# Patient Record
Sex: MALE | Race: BLACK OR AFRICAN AMERICAN | NOT HISPANIC OR LATINO | URBAN - METROPOLITAN AREA
[De-identification: names, ages, dates, MRNs, and addresses within clinical notes are randomized per-mention and may not be internally consistent; named-entity substitution may affect disease eponyms.]

---

## 2022-04-28 ENCOUNTER — HOSPITAL ENCOUNTER (EMERGENCY)
Facility: HOSPITAL | Age: 77
Discharge: PSYCHIATRIC OR SUBSTANCE USE FACILITY | End: 2022-04-29
Attending: EMERGENCY MEDICINE
Payer: MEDICARE

## 2022-04-28 VITALS
HEIGHT: 71 IN | BODY MASS INDEX: 26.18 KG/M2 | HEART RATE: 91 BPM | DIASTOLIC BLOOD PRESSURE: 81 MMHG | WEIGHT: 187 LBS | RESPIRATION RATE: 15 BRPM | OXYGEN SATURATION: 100 % | TEMPERATURE: 98 F | SYSTOLIC BLOOD PRESSURE: 143 MMHG

## 2022-04-28 DIAGNOSIS — R41.82 ALTERED MENTAL STATUS: ICD-10-CM

## 2022-04-28 DIAGNOSIS — F03.91 DEMENTIA WITH BEHAVIORAL DISTURBANCE, UNSPECIFIED DEMENTIA TYPE: Primary | ICD-10-CM

## 2022-04-28 DIAGNOSIS — R41.82 AMS (ALTERED MENTAL STATUS): ICD-10-CM

## 2022-04-28 LAB
ALBUMIN SERPL BCP-MCNC: 4.1 G/DL (ref 3.5–5.2)
ALP SERPL-CCNC: 70 U/L (ref 55–135)
ALT SERPL W/O P-5'-P-CCNC: 10 U/L (ref 10–44)
AMPHET+METHAMPHET UR QL: NEGATIVE
ANION GAP SERPL CALC-SCNC: 13 MMOL/L (ref 8–16)
ANISOCYTOSIS BLD QL SMEAR: SLIGHT
APAP SERPL-MCNC: <3 UG/ML (ref 10–20)
AST SERPL-CCNC: 18 U/L (ref 10–40)
BARBITURATES UR QL SCN>200 NG/ML: NEGATIVE
BASOPHILS # BLD AUTO: 0.01 K/UL (ref 0–0.2)
BASOPHILS NFR BLD: 0.2 % (ref 0–1.9)
BENZODIAZ UR QL SCN>200 NG/ML: NEGATIVE
BILIRUB SERPL-MCNC: 0.3 MG/DL (ref 0.1–1)
BILIRUB UR QL STRIP: NEGATIVE
BUN SERPL-MCNC: 32 MG/DL (ref 8–23)
BZE UR QL SCN: NEGATIVE
CALCIUM SERPL-MCNC: 9.8 MG/DL (ref 8.7–10.5)
CANNABINOIDS UR QL SCN: NEGATIVE
CHLORIDE SERPL-SCNC: 106 MMOL/L (ref 95–110)
CLARITY UR: CLEAR
CO2 SERPL-SCNC: 18 MMOL/L (ref 23–29)
COLOR UR: YELLOW
CREAT SERPL-MCNC: 1.6 MG/DL (ref 0.5–1.4)
CREAT UR-MCNC: 246.8 MG/DL (ref 23–375)
CTP QC/QA: YES
DIFFERENTIAL METHOD: ABNORMAL
EOSINOPHIL # BLD AUTO: 0 K/UL (ref 0–0.5)
EOSINOPHIL NFR BLD: 0.6 % (ref 0–8)
ERYTHROCYTE [DISTWIDTH] IN BLOOD BY AUTOMATED COUNT: 17.7 % (ref 11.5–14.5)
EST. GFR  (AFRICAN AMERICAN): 47 ML/MIN/1.73 M^2
EST. GFR  (NON AFRICAN AMERICAN): 41 ML/MIN/1.73 M^2
ETHANOL SERPL-MCNC: <10 MG/DL
GLUCOSE SERPL-MCNC: 153 MG/DL (ref 70–110)
GLUCOSE UR QL STRIP: NEGATIVE
HCT VFR BLD AUTO: 34.6 % (ref 40–54)
HGB BLD-MCNC: 10.4 G/DL (ref 14–18)
HGB UR QL STRIP: NEGATIVE
HYPOCHROMIA BLD QL SMEAR: ABNORMAL
IMM GRANULOCYTES # BLD AUTO: 0.01 K/UL (ref 0–0.04)
IMM GRANULOCYTES NFR BLD AUTO: 0.2 % (ref 0–0.5)
KETONES UR QL STRIP: ABNORMAL
LEUKOCYTE ESTERASE UR QL STRIP: NEGATIVE
LYMPHOCYTES # BLD AUTO: 0.7 K/UL (ref 1–4.8)
LYMPHOCYTES NFR BLD: 15.9 % (ref 18–48)
MAGNESIUM SERPL-MCNC: 2 MG/DL (ref 1.6–2.6)
MCH RBC QN AUTO: 21.9 PG (ref 27–31)
MCHC RBC AUTO-ENTMCNC: 30.1 G/DL (ref 32–36)
MCV RBC AUTO: 73 FL (ref 82–98)
METHADONE UR QL SCN>300 NG/ML: NEGATIVE
MONOCYTES # BLD AUTO: 0.7 K/UL (ref 0.3–1)
MONOCYTES NFR BLD: 14.4 % (ref 4–15)
NEUTROPHILS # BLD AUTO: 3.2 K/UL (ref 1.8–7.7)
NEUTROPHILS NFR BLD: 68.7 % (ref 38–73)
NITRITE UR QL STRIP: NEGATIVE
NRBC BLD-RTO: 0 /100 WBC
OPIATES UR QL SCN: NEGATIVE
PCP UR QL SCN>25 NG/ML: NEGATIVE
PH UR STRIP: 6 [PH] (ref 5–8)
PLATELET # BLD AUTO: 168 K/UL (ref 150–450)
PLATELET BLD QL SMEAR: ABNORMAL
PMV BLD AUTO: ABNORMAL FL (ref 9.2–12.9)
POCT GLUCOSE: 144 MG/DL (ref 70–110)
POIKILOCYTOSIS BLD QL SMEAR: ABNORMAL
POTASSIUM SERPL-SCNC: 4.6 MMOL/L (ref 3.5–5.1)
PROT SERPL-MCNC: 8.2 G/DL (ref 6–8.4)
PROT UR QL STRIP: ABNORMAL
RBC # BLD AUTO: 4.74 M/UL (ref 4.6–6.2)
SALICYLATES SERPL-MCNC: <5 MG/DL (ref 15–30)
SARS-COV-2 RDRP RESP QL NAA+PROBE: NEGATIVE
SODIUM SERPL-SCNC: 137 MMOL/L (ref 136–145)
SP GR UR STRIP: 1.02 (ref 1–1.03)
TOXICOLOGY INFORMATION: NORMAL
TROPONIN I SERPL DL<=0.01 NG/ML-MCNC: 0.01 NG/ML (ref 0–0.03)
TSH SERPL DL<=0.005 MIU/L-ACNC: 1.03 UIU/ML (ref 0.4–4)
URN SPEC COLLECT METH UR: ABNORMAL
UROBILINOGEN UR STRIP-ACNC: NEGATIVE EU/DL
WBC # BLD AUTO: 4.66 K/UL (ref 3.9–12.7)

## 2022-04-28 PROCEDURE — U0002 COVID-19 LAB TEST NON-CDC: HCPCS | Performed by: EMERGENCY MEDICINE

## 2022-04-28 PROCEDURE — 93010 ELECTROCARDIOGRAM REPORT: CPT | Mod: ,,, | Performed by: INTERNAL MEDICINE

## 2022-04-28 PROCEDURE — 83735 ASSAY OF MAGNESIUM: CPT | Performed by: EMERGENCY MEDICINE

## 2022-04-28 PROCEDURE — 80053 COMPREHEN METABOLIC PANEL: CPT | Performed by: EMERGENCY MEDICINE

## 2022-04-28 PROCEDURE — 82077 ASSAY SPEC XCP UR&BREATH IA: CPT | Performed by: EMERGENCY MEDICINE

## 2022-04-28 PROCEDURE — 84443 ASSAY THYROID STIM HORMONE: CPT | Performed by: EMERGENCY MEDICINE

## 2022-04-28 PROCEDURE — 85025 COMPLETE CBC W/AUTO DIFF WBC: CPT | Performed by: EMERGENCY MEDICINE

## 2022-04-28 PROCEDURE — 84484 ASSAY OF TROPONIN QUANT: CPT | Performed by: EMERGENCY MEDICINE

## 2022-04-28 PROCEDURE — 93005 ELECTROCARDIOGRAM TRACING: CPT

## 2022-04-28 PROCEDURE — 81003 URINALYSIS AUTO W/O SCOPE: CPT | Mod: 59 | Performed by: EMERGENCY MEDICINE

## 2022-04-28 PROCEDURE — 80143 DRUG ASSAY ACETAMINOPHEN: CPT | Performed by: EMERGENCY MEDICINE

## 2022-04-28 PROCEDURE — 99285 EMERGENCY DEPT VISIT HI MDM: CPT | Mod: 25

## 2022-04-28 PROCEDURE — 80307 DRUG TEST PRSMV CHEM ANLYZR: CPT | Performed by: EMERGENCY MEDICINE

## 2022-04-28 PROCEDURE — 80179 DRUG ASSAY SALICYLATE: CPT | Performed by: EMERGENCY MEDICINE

## 2022-04-28 PROCEDURE — 93010 EKG 12-LEAD: ICD-10-PCS | Mod: ,,, | Performed by: INTERNAL MEDICINE

## 2022-04-28 PROCEDURE — 82962 GLUCOSE BLOOD TEST: CPT

## 2022-04-28 NOTE — ED NOTES
Patient identifiers verified and correct for patient  C/C: ams  APPEARANCE: Patient is awake, alert to self only. Dishelved in appearance. Clothes dirty, multiple layers of cloth inappropriate for season,   SKIN: warm, dry and with large abrasions to right elbow region. No breakdown or bruising noted at present time. Poor tugor. Dry mucous membranes  MUSCULOSKELETAL: Patient moving all extremities spontaneously, no obvious swelling or deformities noted. Unsteady gait noted.  RESPIRATORY: Denies shortness of breath.Respirations unlabored. Coarse respirations.  CARDIAC: Denies CP,  Present distal pulses; no peripheral edema noted.  ABDOMEN: denies abdominal pain and n/v/d   : unable to state contience status. Condom cath applied for urine sample.  Neurologic: AAO to self only. Unknown medical history. Patient has illogical thought process, rambling of speech, ; follows commands equal strength in all extremities; denies numbness/tingling. Denies dizziness

## 2022-04-28 NOTE — ED PROVIDER NOTES
Encounter Date: 4/28/2022       History     Chief Complaint   Patient presents with    Fatigue     Patient states that he became overheated while outside. He was trying to assist himself to ground to rest when bystander tried to assist, causing him to fall. Reports abrasion to right elbow but otherwise no complaints. EMS reports that patient was oriented to self only at scene and was slightly combative. On arrival to ED, awake, alert, oriented x 4. Denies pain. No recent illness reported. No distress.      76-year-old male who appears to me to have a history of dementia presents via EMS after they were called by bystanders.  He was found in a parking lot, he did tell people upfront that he was overheated while he was outside, he tells me that he stepped off of a curb causing his knee to be weak and falling down.  He is alert oriented x1 to name only.  He has no acute complaints.  He has abrasions noted to the right forearm with no active bleeding.        Review of patient's allergies indicates:  No Known Allergies  Past Medical History:   Diagnosis Date    Bipolar disorder, unspecified     PTSD (post-traumatic stress disorder)     Seizure      No past surgical history on file.  No family history on file.  Social History     Tobacco Use    Smoking status: Unknown If Ever Smoked   Substance Use Topics    Alcohol use: Not Currently    Drug use: Not Currently     Review of Systems   Unable to perform ROS: Dementia       Physical Exam     Initial Vitals [04/28/22 1343]   BP Pulse Resp Temp SpO2   (!) 146/87 (!) 121 16 97.8 °F (36.6 °C) 99 %      MAP       --         Physical Exam    Constitutional: He appears well-developed and well-nourished.   HENT:   Head: Normocephalic and atraumatic.   Cardiovascular: Normal rate and regular rhythm.   No murmur heard.  Pulmonary/Chest: Breath sounds normal.   Abdominal: Abdomen is soft. There is no abdominal tenderness.   Musculoskeletal:      Comments: Pulses 2+ to all 4  extremities, there is no obvious deformity or dislocation noted to any extremity including the right upper extremity where abrasions are present.     Neurological: He is alert.   Patient is oriented x1 to name   Skin: Capillary refill takes less than 2 seconds.   Two small abrasions noted to the right upper extremity with no active bleeding   Psychiatric: He has a normal mood and affect.         ED Course   Procedures  Labs Reviewed   CBC W/ AUTO DIFFERENTIAL - Abnormal; Notable for the following components:       Result Value    Hemoglobin 10.4 (*)     Hematocrit 34.6 (*)     MCV 73 (*)     MCH 21.9 (*)     MCHC 30.1 (*)     RDW 17.7 (*)     Lymph # 0.7 (*)     Lymph % 15.9 (*)     All other components within normal limits   COMPREHENSIVE METABOLIC PANEL - Abnormal; Notable for the following components:    CO2 18 (*)     Glucose 153 (*)     BUN 32 (*)     Creatinine 1.6 (*)     eGFR if  47 (*)     eGFR if non  41 (*)     All other components within normal limits   URINALYSIS, REFLEX TO URINE CULTURE - Abnormal; Notable for the following components:    Protein, UA Trace (*)     Ketones, UA 1+ (*)     All other components within normal limits    Narrative:     Specimen Source->Urine   SALICYLATE LEVEL - Abnormal; Notable for the following components:    Salicylate Lvl <5.0 (*)     All other components within normal limits   ACETAMINOPHEN LEVEL - Abnormal; Notable for the following components:    Acetaminophen (Tylenol), Serum <3.0 (*)     All other components within normal limits   POCT GLUCOSE - Abnormal; Notable for the following components:    POCT Glucose 144 (*)     All other components within normal limits   DRUG SCREEN PANEL, URINE EMERGENCY    Narrative:     Specimen Source->Urine   MAGNESIUM   TROPONIN I   TSH   ALCOHOL,MEDICAL (ETHANOL)   ACETAMINOPHEN LEVEL   SALICYLATE LEVEL   SARS-COV-2 RDRP GENE        ECG Results          EKG 12-lead (Final result)  Result time 04/29/22  06:14:27    Final result by Interface, Lab In [facility] (04/29/22 06:14:27)                 Narrative:    Test Reason : R41.82,    Vent. Rate : 104 BPM     Atrial Rate : 104 BPM     P-R Int : 164 ms          QRS Dur : 078 ms      QT Int : 306 ms       P-R-T Axes : 070 051 031 degrees     QTc Int : 402 ms    Sinus tachycardia  Nonspecific T wave abnormality  No previous ECGs available  Confirmed by RAMONA PIMENTEL, AGNIESZKA ([id number]) on 4/29/2022 6:14:19 AM    Referred By: STEPHENIE   SELF           Confirmed By:AGNIESZKA GREENE MD                            Imaging Results          CT Head Without Contrast (Final result)  Result time 04/28/22 15:10:32    Final result by Carl Payton MD (04/28/22 15:10:32)                 Impression:      No acute large vascular territory infarct or intracranial hemorrhage identified.  If persistent neurologic deficit, MRI brain with diffusion-weighted sequences can be obtained.    Ventriculomegaly out of proportion to sulcal prominence which could reflect nonspecific normal pressure hydrocephalus.  Correlate clinically.    Senescent change and mild chronic microvascular ischemic change.    Suspected left maxillary chronic sinusitis.      Electronically signed by: Carl Payton MD  Date:    04/28/2022  Time:    15:10             Narrative:    EXAMINATION:  CT HEAD WITHOUT CONTRAST    CLINICAL HISTORY:  AMS;    TECHNIQUE:  Low dose axial CT images obtained throughout the head without intravenous contrast. Sagittal and coronal reconstructions were performed.    COMPARISON:  None.    FINDINGS:  Intracranial compartment: Brain appears normally formed.    Mild generalized cerebral volume loss.  Ventricles are midline and stable in size and configuration without distortion by mass effect noting ventriculomegaly slightly out of proportion to sulcal prominence.  No extra-axial blood or fluid collections.    Mild patchy hypoattenuation of the subcortical and periventricular white matter consistent with  chronic microvascular ischemic change.  No parenchymal mass, hemorrhage, edema or major vascular distribution infarct.    Skull/extracranial contents (limited evaluation): No displaced skull fracture.  Chronic appearing deformity of the right lamina papyracea likely related to remote trauma.  Near-total opacification of the left maxillary sinus with slight hyperostosis suggesting chronic sinusitis.  Remaining paranasal sinuses are clear.  Mastoid air cells are clear.  Probable prior surgery to the bilateral ocular lenses.                               X-Ray Chest 1 View (Final result)  Result time 04/28/22 14:51:01    Final result by Carl Payton MD (04/28/22 14:51:01)                 Impression:      Bibasilar subtle increased interstitial opacities which could reflect pulmonary edema versus chronic interstitial lung changes versus interstitial type pneumonia.  No large consolidation.      Electronically signed by: Carl Payton MD  Date:    04/28/2022  Time:    14:51             Narrative:    EXAMINATION:  XR CHEST 1 VIEW    CLINICAL HISTORY:  Altered mental status, unspecified    TECHNIQUE:  Single frontal view of the chest was performed.    COMPARISON:  None    FINDINGS:  Monitoring leads overlie the chest.  Patient is rotated.    Cardiomediastinal silhouette is midline noting calcification and tortuosity of the aorta and upper limits of normal cardiac silhouette.  Pulmonary vasculature and hilar contours are within normal limits.  Prominent right paratracheal stripe, commonly secondary to ectatic vasculature in this age group.  Bibasilar subtle increased interstitial opacities slightly more so on the left.  No large consolidation, pleural effusion or pneumothorax definitively seen.  No acute osseous process seen.  PA and lateral views can be obtained.                                 Medications - No data to display  Medical Decision Making:   ED Management:  We were able to get in touch with the ex-wife who  states that the patient has a history of dementia.  She states that he drove his car all the way from [facility].  Patient obviously has poor thought content and judgment, I feel he is not able to complete activities of daily living.             ED Course as of 04/29/22 0943   Thu Apr 28, 2022   1806 Pulse(!): 111 [CD]      ED Course User Index  [CD] Yoan Asencio DO        Medically cleared for psychiatry placement: 4/28/2022  8:43 PM    Clinical Impression:   Final diagnoses:  [R41.82] Altered mental status  [R41.82] AMS (altered mental status)  [F03.91] Dementia with behavioral disturbance, unspecified dementia type (Primary)          ED Disposition Condition    Transfer to Psych Facility         ED Prescriptions     None        Follow-up Information    None          Yoan Asencio DO  04/29/22 0944

## 2022-04-29 NOTE — ED NOTES
DIANNA Navarrete states that she last saw patient in February, and he was cooking for self, and cleaning.  States that with visit to [place], where patient lives, several days ago. Noted that patient was not eating or taking care of himself, states that patient had rapid decline in mental status,  When CG noted patient erratic driving, she called patient's primary care doctor who admitted patient to hospital for nursing home placement. Patient somehow left [facility] in [facility] and was a missing person for past 24 hr with police looking for patient.  Unknown if patient drove to [place] or used some other type of transportation.  Patient does not know where his car is at but does have car keys at bedside.    Noted illogical thoughts and incomplete sentences with current conversation at present time.

## 2022-04-29 NOTE — ED NOTES
Patient speaking with psychiatrist. Stated that he does not believe in S.O. did not mean when she said she would kill him. She was just upset. States that when he said that  He would kill himself if he loses his family, he was coming out of a seizure.  States that he is a  and has PTSD. States that he was temporarily at the [facility] because he was homeless at one point.  Reports occasional mariajuana use. Denies SI or HI.  Personality cheerful and joking.  AAO x 3 with no affects of post ictal state or seizures noted.